# Patient Record
Sex: FEMALE | Race: WHITE | NOT HISPANIC OR LATINO | URBAN - NONMETROPOLITAN AREA
[De-identification: names, ages, dates, MRNs, and addresses within clinical notes are randomized per-mention and may not be internally consistent; named-entity substitution may affect disease eponyms.]

---
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
## 2024-01-11 ENCOUNTER — OFFICE VISIT (OUTPATIENT)
Dept: FAMILY MEDICINE CLINIC | Facility: CLINIC | Age: 19
End: 2024-01-11
Payer: COMMERCIAL

## 2024-01-11 VITALS
WEIGHT: 144.2 LBS | DIASTOLIC BLOOD PRESSURE: 82 MMHG | BODY MASS INDEX: 21.86 KG/M2 | SYSTOLIC BLOOD PRESSURE: 112 MMHG | OXYGEN SATURATION: 97 % | HEART RATE: 72 BPM | HEIGHT: 68 IN

## 2024-01-11 DIAGNOSIS — H66.93 CHRONIC INFECTION OF BOTH EARS: Primary | ICD-10-CM

## 2024-01-11 PROCEDURE — 99213 OFFICE O/P EST LOW 20 MIN: CPT | Performed by: STUDENT IN AN ORGANIZED HEALTH CARE EDUCATION/TRAINING PROGRAM

## 2024-01-11 NOTE — PROGRESS NOTES
Office Note     Name: Shalini Whittaker    : 2005     MRN: [id number]     Chief Complaint  Earache    Subjective     History of Present Illness:  Shalini Whittaker is a 18 y.o. female who presents today for chronic ear infection    -ear infection, right going on for 2 weeks now. Left ear is going 4 days.  -Last seen with ear infection that lasted for over a month.  -Says gets an ear infection happens every time she is sick with congestion  -Patient had ear tubes put in as a toddler, only one fell out    Answers submitted by the patient for this visit:  Office Visit on 2024  3:30 PM with Silke Gaitan  Ear Pain Questionnaire (Submitted on 2024)  Chief Complaint: Ear pain  Affected ear: both  Chronicity: recurrent  Onset: 1 to 4 weeks ago  Progression since onset: coming and going  Frequency: monthly  Fever: no fever  Pain - numeric: 6/10  dizziness: No  cough: No  drainage: Yes  headaches: Yes  hearing loss: Yes  hoarse voice: No  neck pain: Yes  rash: No  rhinorrhea: No  sore throat: Yes  congestion: Yes  jaw pain: No  adenopathy: No  tinnitus: No  Treatments tried : NSAIDs, analgesic ear drops  Improvement on treatment: moderate    -would like to see Dr. Kern     Past Medical History:   Past Medical History:   Diagnosis Date   • Ear infection        Past Surgical History:   Past Surgical History:   Procedure Laterality Date   • EAR TUBES Bilateral        Immunizations:   Immunization History   Administered Date(s) Administered   • COVID-19 (PFIZER) Purple Cap Monovalent 2021        Medications:     Current Outpatient Medications:   •  ciprofloxacin-dexAMETHasone (Ciprodex) 0.3-0.1 % otic suspension, Administer 4 drops into both ears 2 (Two) Times a Day., Disp: 7.5 mL, Rfl: 1    Allergies:   No Known Allergies    Family History: History reviewed. No pertinent family history.    Social History:   Social History     Socioeconomic History   • Marital status: Single   Tobacco Use   • Smoking  "status: Never   • Smokeless tobacco: Never   Vaping Use   • Vaping Use: Never used   Substance and Sexual Activity   • Alcohol use: Never   • Drug use: Never   • Sexual activity: Not Currently     Partners: Male     Birth control/protection: Condom         Objective     Vital Signs  /82 (BP Location: Left arm, Patient Position: Sitting, Cuff Size: Adult)   Pulse 72   Ht 172.7 cm (67.99\")   Wt 65.4 kg (144 lb 3.2 oz)   SpO2 97%   BMI 21.93 kg/m²   Estimated body mass index is 21.93 kg/m² as calculated from the following:    Height as of this encounter: 172.7 cm (67.99\").    Weight as of this encounter: 65.4 kg (144 lb 3.2 oz).    Pediatric BMI = 53 %ile (Z= 0.08) based on CDC (Girls, 2-20 Years) BMI-for-age based on BMI available as of 1/11/2024.. BMI is within normal parameters. No other follow-up for BMI required.      Physical Exam  Constitutional:       General: She is not in acute distress.     Appearance: Normal appearance.   HENT:      Head: Normocephalic and atraumatic.      Ears:      Comments: Purulent drainage from left ear.  Unable to fully visualize the TM due to purulence.  Right ear exam is normal  Eyes:      Conjunctiva/sclera: Conjunctivae normal.   Pulmonary:      Effort: Pulmonary effort is normal.   Neurological:      Mental Status: She is alert.   Psychiatric:         Mood and Affect: Mood normal.         Behavior: Behavior normal.         Thought Content: Thought content normal.          Assessment and Plan     Diagnoses and all orders for this visit:    1. Chronic infection of both ears (Primary)  -     Ambulatory Referral to ENT (Otolaryngology)  -     ciprofloxacin-dexAMETHasone (Ciprodex) 0.3-0.1 % otic suspension; Administer 4 drops into both ears 2 (Two) Times a Day.  Dispense: 7.5 mL; Refill: 1    Patient has had recurrent and chronic infections in both of her ears for quite some time now.  She has a lot of purulence from the left ear.  While she is on drops symptoms do improve " but when she discontinues the drops symptoms return.  At this point I think referral to ENT is reasonable.  This was placed today.  We will have her on otic drops in both of her ears until she can get to that appointment.  Follow-up as needed           Follow Up  Return if symptoms worsen or fail to improve.    DO MELIZA Marie [facility]  [address] [facility] [id number] [phone]    NOTE TO PATIENT: The 21st Century Cures Act makes medical notes like these available to patients in the interest of transparency. However, be advised this is a medical document. It is intended as peer to peer communication. It is written in medical language and may contain abbreviations or verbiage that are unfamiliar. It may appear blunt or direct. Medical documents are intended to carry relevant information, facts as evident, and the clinical opinion of the practitioner.

## 2024-01-12 RX ORDER — CIPROFLOXACIN AND DEXAMETHASONE 3; 1 MG/ML; MG/ML
4 SUSPENSION/ DROPS AURICULAR (OTIC) 2 TIMES DAILY
Qty: 7.5 ML | Refills: 1 | Status: SHIPPED | OUTPATIENT
Start: 2024-01-12

## 2024-07-24 ENCOUNTER — OFFICE VISIT (OUTPATIENT)
Dept: FAMILY MEDICINE CLINIC | Facility: CLINIC | Age: 19
End: 2024-07-24
Payer: COMMERCIAL

## 2024-07-24 VITALS
HEART RATE: 84 BPM | BODY MASS INDEX: 21.48 KG/M2 | DIASTOLIC BLOOD PRESSURE: 78 MMHG | SYSTOLIC BLOOD PRESSURE: 118 MMHG | WEIGHT: 145 LBS | OXYGEN SATURATION: 99 % | RESPIRATION RATE: 15 BRPM | HEIGHT: 69 IN

## 2024-07-24 DIAGNOSIS — L02.439 CARBUNCLE AND FURUNCLE OF UPPER ARM AND FOREARM: Primary | ICD-10-CM

## 2024-07-24 DIAGNOSIS — L02.429 CARBUNCLE AND FURUNCLE OF UPPER ARM AND FOREARM: Primary | ICD-10-CM

## 2024-07-24 PROCEDURE — 99213 OFFICE O/P EST LOW 20 MIN: CPT | Performed by: PHYSICIAN ASSISTANT

## 2024-07-24 RX ORDER — SULFAMETHOXAZOLE AND TRIMETHOPRIM 800; 160 MG/1; MG/1
1 TABLET ORAL 2 TIMES DAILY
Qty: 20 TABLET | Refills: 0 | Status: SHIPPED | OUTPATIENT
Start: 2024-07-24

## 2024-07-24 NOTE — PROGRESS NOTES
"      Patient Office Visit      Patient Name: Shalini Whittaker  : 2005   MRN: [id number]     Chief Complaint:    Chief Complaint   Patient presents with    Insect Bite     Patient thinks she might of been bit by a tick       History of Present Illness: Shalini Whittaker is a 19 y.o. female who is here today complaining of a red sore bump on her right forearm.  She thought maybe was a tick bite or some type of insect bite or possibly a staph infection.    Subjective      Review of Systems:         Past Medical History:   Past Medical History:   Diagnosis Date    Ear infection        Past Surgical History:   Past Surgical History:   Procedure Laterality Date    EAR TUBES Bilateral        Family History: History reviewed. No pertinent family history.    Social History:   Social History     Socioeconomic History    Marital status: Single   Tobacco Use    Smoking status: Never    Smokeless tobacco: Never   Vaping Use    Vaping status: Never Used   Substance and Sexual Activity    Alcohol use: Never    Drug use: Never    Sexual activity: Not Currently     Partners: Male     Birth control/protection: Condom, Abstinence       Allergies:   No Known Allergies    Objective     Physical Exam:  Vital Signs:   Vitals:    24 1142   BP: 118/78   BP Location: Right arm   Patient Position: Sitting   Cuff Size: Adult   Pulse: 84   Resp: 15   SpO2: 99%   Weight: 65.8 kg (145 lb)   Height: 175.3 cm (69\")     Body mass index is 21.41 kg/m².   BMI is within normal parameters. No other follow-up for BMI required.       Physical Exam  Constitutional:       Appearance: Normal appearance.   Skin:     Comments: Right extensor forearm with a 1 and half centimeter area of erythema, induration and tenderness.  No fluctuance.   Neurological:      Mental Status: She is alert.         Procedures    Assessment / Plan      Assessment/Plan:   Diagnoses and all orders for this visit:    1. Carbuncle and furuncle of upper arm and forearm (Primary)  -  "    sulfamethoxazole-trimethoprim (Bactrim DS) 800-160 MG per tablet; Take 1 tablet by mouth 2 (Two) Times a Day.  Dispense: 20 tablet; Refill: 0       Most likely another MRSA abscess.  Will treat with Bactrim.  Told patient not to pick, apply warm compresses and keep wound covered if starts to drain.  Wash hands after touching the area to prevent spread to other areas of the body.    Medications:     Current Outpatient Medications:     sulfamethoxazole-trimethoprim (Bactrim DS) 800-160 MG per tablet, Take 1 tablet by mouth 2 (Two) Times a Day., Disp: 20 tablet, Rfl: 0        Follow Up:   No follow-ups on file.    Megan Gusman PA-C   [facility]     NOTE TO PATIENT: The 21st Century Cures Act makes medical notes like these available to patients in the interest of transparency. However, be advised this is a medical document. It is intended as peer to peer communication. It is written in medical language and may contain abbreviations or verbiage that are unfamiliar. It may appear blunt or direct. Medical documents are intended to carry relevant information, facts as evident, and the clinical opinion of the practitioner.